# Patient Record
Sex: FEMALE | Race: WHITE | Employment: FULL TIME | URBAN - METROPOLITAN AREA
[De-identification: names, ages, dates, MRNs, and addresses within clinical notes are randomized per-mention and may not be internally consistent; named-entity substitution may affect disease eponyms.]

---

## 2019-03-14 ENCOUNTER — APPOINTMENT (OUTPATIENT)
Dept: ULTRASOUND IMAGING | Facility: CLINIC | Age: 29
End: 2019-03-14
Attending: EMERGENCY MEDICINE
Payer: COMMERCIAL

## 2019-03-14 ENCOUNTER — HOSPITAL ENCOUNTER (EMERGENCY)
Facility: CLINIC | Age: 29
Discharge: HOME | End: 2019-03-14
Attending: EMERGENCY MEDICINE | Admitting: EMERGENCY MEDICINE
Payer: COMMERCIAL

## 2019-03-14 VITALS
OXYGEN SATURATION: 97 % | TEMPERATURE: 98.8 F | HEART RATE: 102 BPM | DIASTOLIC BLOOD PRESSURE: 77 MMHG | RESPIRATION RATE: 18 BRPM | SYSTOLIC BLOOD PRESSURE: 109 MMHG

## 2019-03-14 DIAGNOSIS — O20.0 THREATENED MISCARRIAGE: ICD-10-CM

## 2019-03-14 DIAGNOSIS — O46.8X1 SUBCHORIONIC HEMATOMA IN FIRST TRIMESTER, FETUS 1 OF MULTIPLE GESTATION: ICD-10-CM

## 2019-03-14 DIAGNOSIS — O41.8X11 SUBCHORIONIC HEMATOMA IN FIRST TRIMESTER, FETUS 1 OF MULTIPLE GESTATION: ICD-10-CM

## 2019-03-14 LAB
ERYTHROCYTE [DISTWIDTH] IN BLOOD BY AUTOMATED COUNT: 13.2 % (ref 10–15)
HCT VFR BLD AUTO: 39.9 % (ref 35–47)
HGB BLD-MCNC: 12.7 G/DL (ref 11.7–15.7)
MCH RBC QN AUTO: 29 PG (ref 26.5–33)
MCHC RBC AUTO-ENTMCNC: 31.8 G/DL (ref 31.5–36.5)
MCV RBC AUTO: 91 FL (ref 78–100)
PLATELET # BLD AUTO: 284 10E9/L (ref 150–450)
RBC # BLD AUTO: 4.38 10E12/L (ref 3.8–5.2)
WBC # BLD AUTO: 9.6 10E9/L (ref 4–11)

## 2019-03-14 PROCEDURE — 76801 OB US < 14 WKS SINGLE FETUS: CPT

## 2019-03-14 PROCEDURE — 85027 COMPLETE CBC AUTOMATED: CPT | Performed by: EMERGENCY MEDICINE

## 2019-03-14 PROCEDURE — 99284 EMERGENCY DEPT VISIT MOD MDM: CPT

## 2019-03-14 PROCEDURE — 99284 EMERGENCY DEPT VISIT MOD MDM: CPT | Mod: 25

## 2019-03-14 NOTE — ED PROVIDER NOTES
History     Chief Complaint:  Vaginal Bleeding    HPI   Amanda Mckinley is a 28 year old  at 13w1d EGA by 1st trimester US, Rh positive female who presents with her son Gaurav to the Emergency Department today for evaluation of vaginal bleeding. Patient reports her son woke her up at 4 AM this morning and when she got out of bed she noticed a gush of vaginal fluid, which mimicked what she felt when her water broke. She went to the bathroom and noticed a lot of pinkish-red fluid. Patient called her physician and was advised to come here since her current pregnancy was less than 20 weeks. Patient reports her previous miscarriages were prior to 8 weeks. This is her sixth pregnancy in total. She has met with her OB/GYN this pregnancy and has had an ultrasound performed. She has no history of blood clotting disorders. Mild cramping though not significant pain.    Allergies:  No known drug allergies     Medications:    The patient is not currently taking any prescribed medications.    Past Medical History:    Miscarriage x4  Myopia  Anxiety  Endometrial mass  Uterine leiomyoma  Depression  Female infertility  Chlamydia    Past Surgical History:    Dilation and curettage  Tonsillectomy  [place] teeth extraction    Family History:    History reviewed. No pertinent family history.     Social History:  Smoking status: Never smoker  Alcohol use: Yes  Marital Status:       Review of Systems   Genitourinary: Positive for vaginal bleeding and vaginal discharge.   All other systems reviewed and are negative.    Physical Exam     Patient Vitals for the past 24 hrs:   BP Temp Temp src Pulse Resp SpO2   19 0541 123/75 98.8  F (37.1  C) Temporal 99 18 97 %       Physical Exam    General:   Well-nourished   Speaking in full sentences  Eyes:   Conjunctiva without injection or scleral icterus  ENT:   Moist mucous membranes   Nares patent   Pinnae normal  Neck:   Full ROM   No stiffness appreciated  Resp:   Lungs CTAB   No  crackles, wheezing or audible rubs   Good air movement  CV:    Normal rate, regular rhythm   S1 and S2 present   No murmur, gallop or rub  GI:   BS present   Abdomen soft without distention   Non-tender to light and deep palpation   No guarding or rebound tenderness  Skin:   Warm, dry, well perfused   No rashes or open wounds on exposed skin  MSK:   Moves all extremities   No focal deformities or swelling  Neuro:   Alert   Answers questions appropriately   Moves all extremities equally   Gait stable  Psych:   Normal affect, normal mood    Emergency Department Course     Imaging:  Radiographic findings were communicated with the patient who voiced understanding of the findings.  US OB <14 Weeks w Transabdominal  IMPRESSION:  1. Single live intrauterine pregnancy measuring 13 weeks 4 days  gestational age.  2. There are at least two small subchorionic hemorrhages. As read by radiology.    Laboratory:  CBC: WNL (WBC 9.6, HGB 12.7, )    Emergency Department Course:  Past medical records, nursing notes, and vitals reviewed.  0553: I performed an exam of the patient and obtained history, as documented above.    IV inserted and blood drawn.    The patient was sent for a OB ultrasound while in the emergency department, findings above.    0645: I rechecked the patient. Explained findings to the patient.    Findings and plan explained to the Patient. Patient discharged home with instructions regarding supportive care, medications, and reasons to return. The importance of close follow-up was reviewed.      Impression & Plan      Medical Decision Making:  Amanda Mckinley is a 28 yr old  at 13w1d EGA by 1st trimester US presenting to the emergency department for evaluation of vaginal bleeding.  VS on presentation unremarkable.  Patient is well-appearing, resting comfortably on the gurney.  Symptoms at present most consistent with threatened miscarriage.  Previous outpatient ultrasound is confirmed the presence of  viable IUP.  Patient is Rh+ and thus does not require RhoGam.  Hemoglobin returned within normal limits today.  Repeat ultrasound reveals the presence of IUP measuring 13w4d EGA.  Ultrasound performed in the transabdominal position.  Patient does have at least 2 subchorionic hemorrhages measuring approximately 2.5 x 0.8 x 2.6 and 4.5 x 0.6 x 2.8 cm in size.  I suspect this is contributing to patient's gush of blood.  On reassessment, patient has remained in stable condition with well-controlled symptoms.  Results and clinical impression are reviewed with the patient.  At this time I do feel she is stable for discharge home with supportive outpatient follow-up.  She does have an OB appointment scheduled for later today which I recommend she continue with.  I have recommended pelvic rest and avoidance of strenuous activities until seen in follow-up.  Return to ER with severe abdominal pain/cramping, worsening vaginal bleeding or any other new or troubling symptoms.  All questions answered prior to discharge.    Diagnosis:    ICD-10-CM   1. Threatened miscarriage O20.0   2. Subchorionic hematoma in first trimester, fetus 1 of multiple gestation O41.8X11    O46.8X1     Disposition:  discharged to home    Sherice Gaurav  3/14/2019   [facility] EMERGENCY DEPARTMENT  Scribe Disclosure:  I, Sherice Gaurav, am serving as a scribe at 5:53 AM on 3/14/2019 to document services personally performed by Irving Condon MD based on my observations and the provider's statements to me.        Irving Condon MD  03/14/19 1913

## 2019-03-14 NOTE — ED TRIAGE NOTES
Pt to ER with c/o vag bleeding  Pt is 13 weeks preg with hx of miscarrage in the past, slight pain and cramping

## 2019-03-14 NOTE — ED AVS SNAPSHOT
[facility] Emergency Department  [address]  [facility] [address]  Phone:  [phone]  Fax:  [phone]                                    Amanda Mckinley   MRN: [id number]    Department:  [facility] Emergency Department   Date of Visit:  3/14/2019           After Visit Summary Signature Page    I have received my discharge instructions, and my questions have been answered. I have discussed any challenges I see with this plan with the nurse or doctor.    ..........................................................................................................................................  Patient/Patient Representative Signature      ..........................................................................................................................................  Patient Representative Print Name and Relationship to Patient    ..................................................               ................................................  Date                                   Time    ..........................................................................................................................................  Reviewed by Signature/Title    ...................................................              ..............................................  Date                                               Time          22EPIC Rev 08/18